# Patient Record
Sex: FEMALE | Race: WHITE | NOT HISPANIC OR LATINO | Employment: PART TIME | URBAN - METROPOLITAN AREA
[De-identification: names, ages, dates, MRNs, and addresses within clinical notes are randomized per-mention and may not be internally consistent; named-entity substitution may affect disease eponyms.]

---

## 2018-11-23 ENCOUNTER — HOSPITAL ENCOUNTER (EMERGENCY)
Facility: HOSPITAL | Age: 18
Discharge: HOME | End: 2018-11-23
Attending: EMERGENCY MEDICINE | Admitting: EMERGENCY MEDICINE

## 2018-11-23 VITALS
OXYGEN SATURATION: 97 % | HEIGHT: 63 IN | WEIGHT: 140 LBS | SYSTOLIC BLOOD PRESSURE: 93 MMHG | BODY MASS INDEX: 24.8 KG/M2 | TEMPERATURE: 99 F | DIASTOLIC BLOOD PRESSURE: 60 MMHG | HEART RATE: 83 BPM | RESPIRATION RATE: 16 BRPM

## 2018-11-23 DIAGNOSIS — R10.2 PELVIC CRAMPING: Primary | ICD-10-CM

## 2018-11-23 LAB
B-HCG UR QL: NEGATIVE
BACTERIA UR QL AUTO: ABNORMAL /HPF
BILIRUB UR QL STRIP: NEGATIVE
CLARITY UR: CLEAR
COLOR UR: YELLOW
GLUCOSE UR STRIP-MCNC: NEGATIVE MG/DL
HGB UR QL STRIP.AUTO: NEGATIVE
HYALINE CASTS UR QL AUTO: ABNORMAL /LPF
KETONES UR QL STRIP: ABNORMAL
LEUKOCYTE ESTERASE UR QL STRIP.AUTO: ABNORMAL
NITRITE UR QL STRIP: NEGATIVE
PH UR STRIP.AUTO: 6 [PH] (ref 5–8)
PROT UR QL STRIP: ABNORMAL
RBC # UR: ABNORMAL /HPF
REF LAB TEST METHOD: ABNORMAL
SP GR UR STRIP: 1.03 (ref 1–1.03)
SQUAMOUS #/AREA URNS HPF: ABNORMAL /HPF
UROBILINOGEN UR QL STRIP: ABNORMAL
WBC UR QL AUTO: ABNORMAL /HPF

## 2018-11-23 PROCEDURE — 99283 EMERGENCY DEPT VISIT LOW MDM: CPT

## 2018-11-23 PROCEDURE — 81025 URINE PREGNANCY TEST: CPT | Performed by: EMERGENCY MEDICINE

## 2018-11-23 PROCEDURE — 81001 URINALYSIS AUTO W/SCOPE: CPT | Performed by: EMERGENCY MEDICINE

## 2018-11-23 RX ORDER — SODIUM CHLORIDE 0.9 % (FLUSH) 0.9 %
10 SYRINGE (ML) INJECTION AS NEEDED
Status: DISCONTINUED | OUTPATIENT
Start: 2018-11-23 | End: 2018-11-24 | Stop reason: HOSPADM

## 2018-11-24 NOTE — ED NOTES
Pt to ED for CC of abdominal cramps x 3 days. Pt has taken a home pregnancy test with negative and positive results. LMP 1st week of November. Pt concerned that if she is not pregnant that something else may be going on.      Niki Barahona RN  11/23/18 2129

## 2018-11-24 NOTE — ED PROVIDER NOTES
EMERGENCY DEPARTMENT ENCOUNTER    CHIEF COMPLAINT  Chief Complaint: Abdominal pain  History given by: patient   History limited by: n/a  Room Number: 19/19  PMD: Provider, No Known      HPI:  Pt is a 17 y.o. female who presents complaining of suprapubic abd cramping that began today. She denies vaginal discharge or vaginal bleeding. Pt states that she has taken 2 home pregnancy test with a positive and indeterminate result. Pt states that she would be starting her menstrual period in 2-3 days typically.     Duration:  1 day  Onset: gradual  Timing: constant   Location: suprapubic  Radiation: none  Quality: cramping   Intensity/Severity: moderate  Progression: unchanged   Associated Symptoms: none  Aggravating Factors: none  Alleviating Factors: none    PAST MEDICAL HISTORY  Active Ambulatory Problems     Diagnosis Date Noted   • No Active Ambulatory Problems     Resolved Ambulatory Problems     Diagnosis Date Noted   • No Resolved Ambulatory Problems     Past Medical History:   Diagnosis Date   • Asthma        PAST SURGICAL HISTORY  Past Surgical History:   Procedure Laterality Date   • WISDOM TOOTH EXTRACTION         FAMILY HISTORY  History reviewed. No pertinent family history.    SOCIAL HISTORY  Social History     Socioeconomic History   • Marital status: Single     Spouse name: Not on file   • Number of children: Not on file   • Years of education: Not on file   • Highest education level: Not on file   Social Needs   • Financial resource strain: Not on file   • Food insecurity - worry: Not on file   • Food insecurity - inability: Not on file   • Transportation needs - medical: Not on file   • Transportation needs - non-medical: Not on file   Occupational History   • Not on file   Tobacco Use   • Smoking status: Current Every Day Smoker     Packs/day: 1.00     Types: Cigarettes   Substance and Sexual Activity   • Alcohol use: No     Frequency: Never   • Drug use: Yes     Types: Marijuana   • Sexual activity: Not  on file   Other Topics Concern   • Not on file   Social History Narrative   • Not on file       ALLERGIES  Patient has no known allergies.    REVIEW OF SYSTEMS  Review of Systems   Constitutional: Negative for fever.   HENT: Negative for sore throat.    Eyes: Negative.    Respiratory: Negative for cough and shortness of breath.    Cardiovascular: Negative for chest pain.   Gastrointestinal: Positive for abdominal pain (cramping). Negative for diarrhea and vomiting.   Genitourinary: Negative for dysuria.   Musculoskeletal: Negative for neck pain.   Skin: Negative for rash.   Allergic/Immunologic: Negative.    Neurological: Negative for weakness, numbness and headaches.   Hematological: Negative.    Psychiatric/Behavioral: Negative.    All other systems reviewed and are negative.      PHYSICAL EXAM  ED Triage Vitals   Temp Heart Rate Resp BP SpO2   11/23/18 2121 11/23/18 2121 11/23/18 2121 11/23/18 2142 11/23/18 2121   99 °F (37.2 °C) 83 16 112/67 97 %      Temp src Heart Rate Source Patient Position BP Location FiO2 (%)   11/23/18 2121 11/23/18 2121 -- -- --   Tympanic Monitor          Physical Exam   Constitutional: She is oriented to person, place, and time. No distress.   HENT:   Head: Normocephalic and atraumatic.   Eyes: EOM are normal. Pupils are equal, round, and reactive to light.   Neck: Normal range of motion. Neck supple.   Cardiovascular: Normal rate, regular rhythm and normal heart sounds.   Pulmonary/Chest: Effort normal and breath sounds normal. No respiratory distress.   Abdominal: Soft. There is no tenderness. There is no rebound and no guarding.   Musculoskeletal: Normal range of motion. She exhibits no edema.   Neurological: She is alert and oriented to person, place, and time. She has normal sensation and normal strength.   Skin: Skin is warm and dry. No rash noted.   Psychiatric: Mood and affect normal.   Nursing note and vitals reviewed.      LAB RESULTS  Lab Results (last 24 hours)      Procedure Component Value Units Date/Time    Urinalysis With Microscopic If Indicated (No Culture) - Urine, Clean Catch [[id number]]  (Abnormal) Collected:  11/23/18 2134    Specimen:  Urine, Clean Catch Updated:  11/23/18 2155     Color, UA Yellow     Appearance, UA Clear     pH, UA 6.0     Specific Gravity, UA 1.029     Glucose, UA Negative     Ketones, UA Trace     Bilirubin, UA Negative     Blood, UA Negative     Protein, UA 30 mg/dL (1+)     Leuk Esterase, UA Trace     Nitrite, UA Negative     Urobilinogen, UA 0.2 E.U./dL    Urinalysis, Microscopic Only - Urine, Clean Catch [[id number]]  (Abnormal) Collected:  11/23/18 2134    Specimen:  Urine, Clean Catch Updated:  11/23/18 2155     RBC, UA 0-2 /HPF      WBC, UA 3-5 /HPF      Bacteria, UA None Seen /HPF      Squamous Epithelial Cells, UA 3-6 /HPF      Hyaline Casts, UA 3-6 /LPF      Methodology Automated Microscopy    Pregnancy, Urine - Urine, Clean Catch [[id number]]  (Normal) Collected:  11/23/18 2134    Specimen:  Urine, Clean Catch Updated:  11/23/18 2208     HCG, Urine QL Negative          I ordered the above labs and reviewed the results    PROCEDURES  Procedures      PROGRESS AND CONSULTS       21:56  CMP, hcg, lipase, UA, and CBC ordered.     22:14  BP- 112/67 HR- 83 Temp- 99 °F (37.2 °C) (Tympanic) O2 sat- 97%   Informed pt of the plan for labs. Pt states that she refuse to have blood drawn but will provide urine. Will order urine pregnancy. Pt understands and agrees with the plan, all questions answered.    21;56  Urine pregnancy ordered.     22:45  BP- 93/60 HR- 83 Temp- 99 °F (37.2 °C) (Tympanic) O2 sat- 97%  Rechecked the patient who is in NAD and is resting comfortably. Informed pt that the pregnancy test is negative. Cramping is likely pre-menstrual. Plan for d/c. Pt understands and agrees with the plan, all questions answered.    MEDICAL DECISION MAKING  Results were reviewed/discussed with the patient and they were also made aware of online  access. Pt also made aware that some labs, such as cultures, will not be resulted during ER visit and follow up with PMD is necessary.     MDM  Number of Diagnoses or Management Options  Pelvic cramping:      Amount and/or Complexity of Data Reviewed  Clinical lab tests: ordered and reviewed (Hcg=negative)  Decide to obtain previous medical records or to obtain history from someone other than the patient: yes  Review and summarize past medical records: yes (No prior visits )    Patient Progress  Patient progress: stable         DIAGNOSIS  Final diagnoses:   Pelvic cramping       DISPOSITION  DISCHARGE    Patient discharged in stable condition.    Reviewed implications of results, diagnosis, meds, responsibility to follow up, warning signs and symptoms of possible worsening, potential complications and reasons to return to ER.    Patient/Family voiced understanding of above instructions.    Discussed plan for discharge, as there is no emergent indication for admission. Patient referred to primary care provider for BP management due to today's BP. Pt/family is agreeable and understands need for follow up and repeat testing.  Pt is aware that discharge does not mean that nothing is wrong but it indicates no emergency is present that requires admission and they must continue care with follow-up as given below or physician of their choice.     FOLLOW-UP  [facility]  [facility] [id number] [phone]  Schedule an appointment as soon as possible for a visit            Medication List      No changes were made to your prescriptions during this visit.       Latest Documented Vital Signs:  As of 10:52 PM  BP- 93/60 HR- 83 Temp- 99 °F (37.2 °C) (Tympanic) O2 sat- 97%    --  Documentation assistance provided by jose r Gruber for Dr Fernandes.  Information recorded by the jose r was done at my direction and has been verified and validated by me.        Yelena Gruber  11/23/18  2242       Phillip Fernandes MD  11/23/18 9578

## 2018-11-24 NOTE — ED NOTES
Spoke to Oksana from lab, advised that urine pregnancy had been added.       Nikki Carbajal, RN  11/23/18 0691

## 2018-11-24 NOTE — ED NOTES
"Pt to Room 19 with c/o \"abdominal cramping that started when I got up from work.\"  Pt states \"I don't want to have an IV and I don't want blood drawn, but I'll do whatever else.\"  Pt states that she might be pregnant but doesn't know for sure.  Pt is alert and oriented X4, PERRLA, respirations are even and unlabored, chest rise and fall is equal in expansion.  Pt does not appear to be in distress at this time.  Pt denies fever, chills, n/v/d, blurred vision, chest pain, loss in control of bowel/bladder, numbness and/or tingling of the extremities at this time.  Bed in low position, call light within reach, side rails up X2.         Nikki Carbajal, RN  11/23/18 2132    "

## 2018-11-24 NOTE — ED NOTES
Pt declined to have blood brawn and IV placed at this time.       Nikki Carbajal, RN  11/23/18 8167

## 2018-11-28 ENCOUNTER — HOSPITAL ENCOUNTER (EMERGENCY)
Facility: HOSPITAL | Age: 18
Discharge: HOME | End: 2018-11-28
Attending: EMERGENCY MEDICINE | Admitting: EMERGENCY MEDICINE

## 2018-11-28 VITALS
HEART RATE: 83 BPM | TEMPERATURE: 97.9 F | OXYGEN SATURATION: 99 % | WEIGHT: 142.7 LBS | HEIGHT: 63 IN | DIASTOLIC BLOOD PRESSURE: 65 MMHG | SYSTOLIC BLOOD PRESSURE: 114 MMHG | RESPIRATION RATE: 14 BRPM | BODY MASS INDEX: 25.29 KG/M2

## 2018-11-28 DIAGNOSIS — R51.9 HEADACHE, UNSPECIFIED HEADACHE TYPE: Primary | ICD-10-CM

## 2018-11-28 PROCEDURE — 99283 EMERGENCY DEPT VISIT LOW MDM: CPT

## 2018-11-28 RX ORDER — ACETAMINOPHEN 500 MG
1000 TABLET ORAL ONCE
Status: COMPLETED | OUTPATIENT
Start: 2018-11-28 | End: 2018-11-28

## 2018-11-28 RX ADMIN — ACETAMINOPHEN 1000 MG: 500 TABLET, FILM COATED ORAL at 22:18
